# Patient Record
Sex: MALE | Race: WHITE
[De-identification: names, ages, dates, MRNs, and addresses within clinical notes are randomized per-mention and may not be internally consistent; named-entity substitution may affect disease eponyms.]

---

## 2019-09-19 ENCOUNTER — HOSPITAL ENCOUNTER (OUTPATIENT)
Age: 64
End: 2019-09-19
Payer: MEDICARE

## 2019-09-19 DIAGNOSIS — M25.572: Primary | ICD-10-CM

## 2019-09-19 PROCEDURE — 73610 X-RAY EXAM OF ANKLE: CPT

## 2020-03-11 ENCOUNTER — HOSPITAL ENCOUNTER
Age: 65
End: 2020-03-11
Payer: MEDICARE

## 2020-03-11 DIAGNOSIS — R53.83: Primary | ICD-10-CM

## 2020-03-11 DIAGNOSIS — E78.2: ICD-10-CM

## 2020-03-11 DIAGNOSIS — I10: ICD-10-CM

## 2020-03-11 DIAGNOSIS — N52.9: ICD-10-CM

## 2020-03-11 LAB
ADD MANUAL DIFF / SLIDE REVIEW: (no result)
BUN SERPL-MCNC: 12 MG/DL (ref 9–20)
CALCIUM SERPL-MCNC: 9.4 MG/DL (ref 8.4–10.2)
CHLORIDE SERPL-SCNC: 104 MMOL/L (ref 98–107)
CHOLEST SERPL-MCNC: 148 MG/DL (ref 140–199)
CO2 SERPL-SCNC: 26 MMOL/L (ref 22–32)
ESTIMATED GLOMERULAR FILT RATE: > 60 ML/MIN (ref 60–?)
GLUCOSE SERPL-MCNC: 213 MG/DL (ref 80–110)
HDLC SERPL-MCNC: 35 MG/DL (ref 40–60)
HEMATOCRIT: 45.9 % (ref 41–53)
HEMOGLOBIN: 15.9 G/DL (ref 13.5–17.5)
HEMOLYSIS: < 15 (ref 0–50)
LYMPHOCYTES # SPEC AUTO: 1800 /UL (ref 1100–4500)
MCV RBC: 90.8 FL (ref 80–100)
MEAN CORPUSCULAR HEMOGLOBIN: 31.5 PG (ref 26–34)
MEAN CORPUSCULAR HGB CONC: 34.7 % (ref 30–36)
PLATELET COUNT: 108 X10^3/UL (ref 150–400)
POTASSIUM SERPL-SCNC: 4 MMOL/L (ref 3.4–5.1)
SODIUM SERPL-SCNC: 140 MMOL/L (ref 137–145)
TRIGL SERPL-MCNC: 380 MG/DL (ref 35–150)
TSH W/ REFLEX TO FT4: 1.84 UIU/ML (ref 0.47–4.68)

## 2020-03-11 PROCEDURE — 80061 LIPID PANEL: CPT

## 2020-03-11 PROCEDURE — 84403 ASSAY OF TOTAL TESTOSTERONE: CPT

## 2020-03-11 PROCEDURE — 84450 TRANSFERASE (AST) (SGOT): CPT

## 2020-03-11 PROCEDURE — 84443 ASSAY THYROID STIM HORMONE: CPT

## 2020-03-11 PROCEDURE — 85025 COMPLETE CBC W/AUTO DIFF WBC: CPT

## 2020-03-11 PROCEDURE — 80048 BASIC METABOLIC PNL TOTAL CA: CPT

## 2020-06-09 ENCOUNTER — HOSPITAL ENCOUNTER (OUTPATIENT)
Age: 65
End: 2020-06-09
Payer: MEDICARE

## 2020-06-09 DIAGNOSIS — M77.32: ICD-10-CM

## 2020-06-09 DIAGNOSIS — M19.072: Primary | ICD-10-CM

## 2020-06-09 PROCEDURE — 73700 CT LOWER EXTREMITY W/O DYE: CPT

## 2020-06-18 ENCOUNTER — HOSPITAL ENCOUNTER (OUTPATIENT)
Age: 65
End: 2020-06-18
Payer: MEDICARE

## 2020-06-18 DIAGNOSIS — R50.9: Primary | ICD-10-CM

## 2020-06-18 PROCEDURE — 87635 SARS-COV-2 COVID-19 AMP PRB: CPT

## 2020-06-23 ENCOUNTER — HOSPITAL ENCOUNTER
Age: 65
End: 2020-06-23
Payer: MEDICARE

## 2020-06-23 DIAGNOSIS — R31.9: Primary | ICD-10-CM

## 2020-06-23 PROCEDURE — 87186 SC STD MICRODIL/AGAR DIL: CPT

## 2020-06-23 PROCEDURE — 87086 URINE CULTURE/COLONY COUNT: CPT

## 2020-06-23 PROCEDURE — 87077 CULTURE AEROBIC IDENTIFY: CPT

## 2021-05-26 ENCOUNTER — HOSPITAL ENCOUNTER (OUTPATIENT)
Age: 66
End: 2021-05-26
Payer: MEDICARE

## 2021-05-26 DIAGNOSIS — R22.41: Primary | ICD-10-CM

## 2021-05-26 DIAGNOSIS — R52: ICD-10-CM

## 2021-05-26 PROCEDURE — 93971 EXTREMITY STUDY: CPT

## 2021-06-09 ENCOUNTER — HOSPITAL ENCOUNTER (OUTPATIENT)
Dept: HOSPITAL 73 - ED | Age: 66
Setting detail: OBSERVATION
LOS: 1 days | Discharge: HOME | End: 2021-06-10
Payer: MEDICARE

## 2021-06-09 VITALS — HEART RATE: 102 BPM | SYSTOLIC BLOOD PRESSURE: 136 MMHG | DIASTOLIC BLOOD PRESSURE: 81 MMHG

## 2021-06-09 VITALS
RESPIRATION RATE: 25 BRPM | OXYGEN SATURATION: 99 % | HEART RATE: 80 BPM | SYSTOLIC BLOOD PRESSURE: 205 MMHG | DIASTOLIC BLOOD PRESSURE: 100 MMHG

## 2021-06-09 VITALS — OXYGEN SATURATION: 98 % | HEART RATE: 72 BPM | RESPIRATION RATE: 30 BRPM

## 2021-06-09 VITALS
RESPIRATION RATE: 15 BRPM | OXYGEN SATURATION: 96 % | DIASTOLIC BLOOD PRESSURE: 79 MMHG | HEART RATE: 67 BPM | SYSTOLIC BLOOD PRESSURE: 155 MMHG

## 2021-06-09 VITALS
DIASTOLIC BLOOD PRESSURE: 88 MMHG | OXYGEN SATURATION: 97 % | HEART RATE: 69 BPM | SYSTOLIC BLOOD PRESSURE: 187 MMHG | TEMPERATURE: 97.9 F | RESPIRATION RATE: 18 BRPM

## 2021-06-09 VITALS
DIASTOLIC BLOOD PRESSURE: 84 MMHG | SYSTOLIC BLOOD PRESSURE: 183 MMHG | RESPIRATION RATE: 14 BRPM | OXYGEN SATURATION: 97 % | HEART RATE: 67 BPM

## 2021-06-09 VITALS
DIASTOLIC BLOOD PRESSURE: 74 MMHG | TEMPERATURE: 97.52 F | OXYGEN SATURATION: 96 % | SYSTOLIC BLOOD PRESSURE: 159 MMHG | RESPIRATION RATE: 16 BRPM | HEART RATE: 71 BPM

## 2021-06-09 VITALS
DIASTOLIC BLOOD PRESSURE: 81 MMHG | SYSTOLIC BLOOD PRESSURE: 136 MMHG | TEMPERATURE: 97.52 F | HEART RATE: 80 BPM | RESPIRATION RATE: 16 BRPM | OXYGEN SATURATION: 97 %

## 2021-06-09 VITALS — OXYGEN SATURATION: 96 % | RESPIRATION RATE: 15 BRPM | HEART RATE: 67 BPM

## 2021-06-09 VITALS — OXYGEN SATURATION: 97 % | RESPIRATION RATE: 13 BRPM | HEART RATE: 67 BPM

## 2021-06-09 VITALS
DIASTOLIC BLOOD PRESSURE: 81 MMHG | HEART RATE: 73 BPM | RESPIRATION RATE: 27 BRPM | SYSTOLIC BLOOD PRESSURE: 143 MMHG | OXYGEN SATURATION: 96 %

## 2021-06-09 VITALS — RESPIRATION RATE: 23 BRPM | HEART RATE: 71 BPM | OXYGEN SATURATION: 98 %

## 2021-06-09 VITALS — HEART RATE: 71 BPM | RESPIRATION RATE: 19 BRPM | OXYGEN SATURATION: 98 %

## 2021-06-09 VITALS — BODY MASS INDEX: 1 KG/M2

## 2021-06-09 VITALS — RESPIRATION RATE: 15 BRPM | OXYGEN SATURATION: 96 % | HEART RATE: 66 BPM

## 2021-06-09 DIAGNOSIS — D72.829: ICD-10-CM

## 2021-06-09 DIAGNOSIS — M25.50: ICD-10-CM

## 2021-06-09 DIAGNOSIS — E11.9: ICD-10-CM

## 2021-06-09 DIAGNOSIS — D17.79: ICD-10-CM

## 2021-06-09 DIAGNOSIS — I10: ICD-10-CM

## 2021-06-09 DIAGNOSIS — Z20.822: ICD-10-CM

## 2021-06-09 DIAGNOSIS — E78.5: ICD-10-CM

## 2021-06-09 DIAGNOSIS — R42: ICD-10-CM

## 2021-06-09 DIAGNOSIS — R55: Primary | ICD-10-CM

## 2021-06-09 DIAGNOSIS — Z79.84: ICD-10-CM

## 2021-06-09 LAB
ADD MANUAL DIFF / SLIDE REVIEW: NO
ALBUMIN SERPL-MCNC: 3.9 G/DL (ref 3.5–5)
ALBUMIN/GLOB SERPL: 1.3 {RATIO} (ref 1–2.8)
ALP SERPL-CCNC: 75 U/L (ref 38–126)
ALT SERPL-CCNC: 20 IU/L (ref ?–50)
BUN SERPL-MCNC: 22 MG/DL (ref 9–20)
CALCIUM SERPL-MCNC: 9.4 MG/DL (ref 8.4–10.2)
CHLORIDE SERPL-SCNC: 102 MMOL/L (ref 98–107)
CK SERPL-CCNC: 43 U/L (ref 55–170)
CKMB % RELATIVE INDEX: (no result) % (ref 1.5–5)
CO2 SERPL-SCNC: 26 MMOL/L (ref 22–32)
ESTIMATED GLOMERULAR FILT RATE: > 60 ML/MIN (ref 60–?)
GLOBULIN SER CALC-MCNC: 2.9 G/DL (ref 1.7–4.1)
GLUCOSE SERPL-MCNC: 162 MG/DL (ref 80–110)
HEMATOCRIT: 45.7 % (ref 41–53)
HEMOGLOBIN: 15.2 G/DL (ref 13.5–17.5)
HEMOLYSIS: < 15 (ref 0–50)
LIPASE SERPL-CCNC: 191 U/L (ref 23–300)
LYMPHOCYTES # SPEC AUTO: 2500 /UL (ref 1100–4500)
MCV RBC: 89.3 FL (ref 80–100)
MEAN CORPUSCULAR HEMOGLOBIN: 29.8 PG (ref 26–34)
MEAN CORPUSCULAR HGB CONC: 33.3 % (ref 30–36)
NT-PROBNP SERPL-MCNC: 46 PG/ML (ref ?–125)
PLATELET COUNT: 179 X10^3/UL (ref 150–400)
POTASSIUM SERPL-SCNC: 4 MMOL/L (ref 3.4–5.1)
PROT SERPL-MCNC: 6.8 G/DL (ref 6.3–8.2)
SODIUM SERPL-SCNC: 137 MMOL/L (ref 137–145)
TROPONIN I SERPL-MCNC: < 0.012 NG/ML (ref 0.01–0.03)

## 2021-06-09 PROCEDURE — 85025 COMPLETE CBC W/AUTO DIFF WBC: CPT

## 2021-06-09 PROCEDURE — 96360 HYDRATION IV INFUSION INIT: CPT

## 2021-06-09 PROCEDURE — C8929 TTE W OR WO FOL WCON,DOPPLER: HCPCS

## 2021-06-09 PROCEDURE — 82550 ASSAY OF CK (CPK): CPT

## 2021-06-09 PROCEDURE — 71275 CT ANGIOGRAPHY CHEST: CPT

## 2021-06-09 PROCEDURE — 82962 GLUCOSE BLOOD TEST: CPT

## 2021-06-09 PROCEDURE — 97116 GAIT TRAINING THERAPY: CPT

## 2021-06-09 PROCEDURE — 96361 HYDRATE IV INFUSION ADD-ON: CPT

## 2021-06-09 PROCEDURE — 36415 COLL VENOUS BLD VENIPUNCTURE: CPT

## 2021-06-09 PROCEDURE — 97162 PT EVAL MOD COMPLEX 30 MIN: CPT

## 2021-06-09 PROCEDURE — 99284 EMERGENCY DEPT VISIT MOD MDM: CPT

## 2021-06-09 PROCEDURE — 83880 ASSAY OF NATRIURETIC PEPTIDE: CPT

## 2021-06-09 PROCEDURE — 80048 BASIC METABOLIC PNL TOTAL CA: CPT

## 2021-06-09 PROCEDURE — 93005 ELECTROCARDIOGRAM TRACING: CPT

## 2021-06-09 PROCEDURE — 84484 ASSAY OF TROPONIN QUANT: CPT

## 2021-06-09 PROCEDURE — 74174 CTA ABD&PLVS W/CONTRAST: CPT

## 2021-06-09 PROCEDURE — G0378 HOSPITAL OBSERVATION PER HR: HCPCS

## 2021-06-09 PROCEDURE — 80053 COMPREHEN METABOLIC PANEL: CPT

## 2021-06-09 PROCEDURE — 87635 SARS-COV-2 COVID-19 AMP PRB: CPT

## 2021-06-09 PROCEDURE — 71045 X-RAY EXAM CHEST 1 VIEW: CPT

## 2021-06-09 PROCEDURE — 85379 FIBRIN DEGRADATION QUANT: CPT

## 2021-06-09 PROCEDURE — 83690 ASSAY OF LIPASE: CPT

## 2021-06-09 PROCEDURE — 96372 THER/PROPH/DIAG INJ SC/IM: CPT

## 2021-06-09 RX ADMIN — ASPIRIN 324 MG: 81 TABLET, CHEWABLE ORAL at 12:11

## 2021-06-09 RX ADMIN — SODIUM CHLORIDE 150 ML: 0.9 INJECTION, SOLUTION INTRAVENOUS at 12:11

## 2021-06-09 RX ADMIN — INSULIN LISPRO 0 UNIT: 100 INJECTION, SOLUTION INTRAVENOUS; SUBCUTANEOUS at 22:00

## 2021-06-09 RX ADMIN — METFORMIN HYDROCHLORIDE 1000 MG: 500 TABLET ORAL at 21:59

## 2021-06-09 RX ADMIN — SODIUM CHLORIDE 150 ML: 0.9 INJECTION, SOLUTION INTRAVENOUS at 22:05

## 2021-06-10 VITALS
SYSTOLIC BLOOD PRESSURE: 143 MMHG | TEMPERATURE: 98.1 F | HEART RATE: 86 BPM | OXYGEN SATURATION: 96 % | DIASTOLIC BLOOD PRESSURE: 86 MMHG | RESPIRATION RATE: 18 BRPM

## 2021-06-10 VITALS — DIASTOLIC BLOOD PRESSURE: 82 MMHG | OXYGEN SATURATION: 96 % | HEART RATE: 86 BPM | SYSTOLIC BLOOD PRESSURE: 148 MMHG

## 2021-06-10 VITALS
HEART RATE: 89 BPM | DIASTOLIC BLOOD PRESSURE: 73 MMHG | SYSTOLIC BLOOD PRESSURE: 137 MMHG | OXYGEN SATURATION: 97 % | RESPIRATION RATE: 16 BRPM | TEMPERATURE: 98.7 F

## 2021-06-10 VITALS
OXYGEN SATURATION: 97 % | DIASTOLIC BLOOD PRESSURE: 90 MMHG | HEART RATE: 85 BPM | TEMPERATURE: 99 F | RESPIRATION RATE: 20 BRPM | SYSTOLIC BLOOD PRESSURE: 170 MMHG

## 2021-06-10 LAB
ADD MANUAL DIFF / SLIDE REVIEW: NO
BUN SERPL-MCNC: 16 MG/DL (ref 9–20)
CALCIUM SERPL-MCNC: 8.6 MG/DL (ref 8.4–10.2)
CHLORIDE SERPL-SCNC: 105 MMOL/L (ref 98–107)
CO2 SERPL-SCNC: 25 MMOL/L (ref 22–32)
ESTIMATED GLOMERULAR FILT RATE: > 60 ML/MIN (ref 60–?)
GLUCOSE SERPL-MCNC: 176 MG/DL (ref 80–110)
HEMATOCRIT: 40.9 % (ref 41–53)
HEMOGLOBIN: 13.7 G/DL (ref 13.5–17.5)
HEMOLYSIS: < 15 (ref 0–50)
LYMPHOCYTES # SPEC AUTO: 1800 /UL (ref 1100–4500)
MCV RBC: 88.8 FL (ref 80–100)
MEAN CORPUSCULAR HEMOGLOBIN: 29.8 PG (ref 26–34)
MEAN CORPUSCULAR HGB CONC: 33.5 % (ref 30–36)
PLATELET COUNT: 135 X10^3/UL (ref 150–400)
POTASSIUM SERPL-SCNC: 4.2 MMOL/L (ref 3.4–5.1)
SODIUM SERPL-SCNC: 136 MMOL/L (ref 137–145)
TROPONIN I SERPL-MCNC: < 0.012 NG/ML (ref 0.01–0.03)

## 2021-06-10 RX ADMIN — METFORMIN HYDROCHLORIDE 1000 MG: 500 TABLET ORAL at 10:30

## 2021-06-10 RX ADMIN — OXYCODONE AND ACETAMINOPHEN 1 TAB: 5; 325 TABLET ORAL at 14:15

## 2021-06-10 RX ADMIN — AMLODIPINE BESYLATE 5 MG: 5 TABLET ORAL at 10:30

## 2021-06-10 RX ADMIN — OXYCODONE AND ACETAMINOPHEN 1 TAB: 5; 325 TABLET ORAL at 01:34

## 2021-06-10 RX ADMIN — ATORVASTATIN CALCIUM 40 MG: 20 TABLET, FILM COATED ORAL at 10:31

## 2021-06-10 RX ADMIN — Medication 10 ML: at 10:57

## 2021-06-10 RX ADMIN — Medication 10 ML: at 04:49

## 2021-06-10 RX ADMIN — OXYCODONE AND ACETAMINOPHEN 1 TAB: 5; 325 TABLET ORAL at 10:56

## 2021-06-10 RX ADMIN — INSULIN LISPRO 0 UNIT: 100 INJECTION, SOLUTION INTRAVENOUS; SUBCUTANEOUS at 12:53

## 2021-06-10 RX ADMIN — ASPIRIN 81 MG: 81 TABLET, COATED ORAL at 10:32

## 2021-06-22 ENCOUNTER — HOSPITAL ENCOUNTER (OUTPATIENT)
Age: 66
End: 2021-06-22
Payer: MEDICARE

## 2021-06-22 VITALS — BODY MASS INDEX: 35.8 KG/M2

## 2021-06-22 DIAGNOSIS — Z20.822: Primary | ICD-10-CM

## 2021-06-22 PROCEDURE — 87635 SARS-COV-2 COVID-19 AMP PRB: CPT

## 2021-06-23 ENCOUNTER — HOSPITAL ENCOUNTER (OUTPATIENT)
Age: 66
Discharge: HOME | End: 2021-06-23
Payer: MEDICARE

## 2021-06-23 VITALS
TEMPERATURE: 98 F | OXYGEN SATURATION: 97 % | RESPIRATION RATE: 14 BRPM | SYSTOLIC BLOOD PRESSURE: 176 MMHG | DIASTOLIC BLOOD PRESSURE: 98 MMHG | HEART RATE: 83 BPM

## 2021-06-23 VITALS
DIASTOLIC BLOOD PRESSURE: 101 MMHG | OXYGEN SATURATION: 97 % | SYSTOLIC BLOOD PRESSURE: 167 MMHG | HEART RATE: 78 BPM | TEMPERATURE: 97.7 F | RESPIRATION RATE: 16 BRPM

## 2021-06-23 VITALS — BODY MASS INDEX: 35.8 KG/M2 | BODY MASS INDEX: 35 KG/M2

## 2021-06-23 DIAGNOSIS — K21.9: ICD-10-CM

## 2021-06-23 DIAGNOSIS — I10: ICD-10-CM

## 2021-06-23 DIAGNOSIS — H53.8: Primary | ICD-10-CM

## 2021-06-23 DIAGNOSIS — E11.9: ICD-10-CM

## 2021-06-23 DIAGNOSIS — M25.50: ICD-10-CM

## 2021-06-23 DIAGNOSIS — Z53.09: ICD-10-CM

## 2021-06-23 PROCEDURE — 37609 LIGATION/BX TEMPORAL ARTERY: CPT

## 2021-06-23 PROCEDURE — 82962 GLUCOSE BLOOD TEST: CPT

## 2022-05-23 ENCOUNTER — HOSPITAL ENCOUNTER (OUTPATIENT)
Age: 67
End: 2022-05-23
Payer: MEDICARE

## 2022-05-23 VITALS — BODY MASS INDEX: 35.8 KG/M2

## 2022-05-23 DIAGNOSIS — M35.3: ICD-10-CM

## 2022-05-23 DIAGNOSIS — E11.69: Primary | ICD-10-CM

## 2022-05-23 DIAGNOSIS — E78.5: ICD-10-CM

## 2022-05-23 DIAGNOSIS — I10: ICD-10-CM

## 2022-05-23 DIAGNOSIS — E78.2: ICD-10-CM

## 2022-05-23 DIAGNOSIS — N40.0: ICD-10-CM

## 2022-05-23 LAB
ALBUMIN SERPL-MCNC: 4.5 G/DL (ref 3.5–5)
ALBUMIN/GLOB SERPL: 1.7 {RATIO} (ref 1–2.8)
ALP SERPL-CCNC: 72 U/L (ref 38–126)
ALT SERPL-CCNC: 22 IU/L (ref ?–50)
BUN SERPL-MCNC: 24 MG/DL (ref 9–20)
CALCIUM SERPL-MCNC: 9.3 MG/DL (ref 8.4–10.2)
CHLORIDE SERPL-SCNC: 102 MMOL/L (ref 98–107)
CHOLEST SERPL-MCNC: 162 MG/DL (ref 140–199)
CO2 SERPL-SCNC: 28 MMOL/L (ref 22–32)
ESTIMATED GLOMERULAR FILT RATE: > 60 ML/MIN (ref 60–?)
GLOBULIN SER CALC-MCNC: 2.6 G/DL (ref 1.7–4.1)
GLUCOSE SERPL-MCNC: 177 MG/DL (ref 80–110)
HBA1C MFR BLD: 7.5 % (ref 4–6)
HDLC SERPL-MCNC: 54 MG/DL (ref 40–60)
HEMATOCRIT: 44.5 % (ref 41–53)
HEMOGLOBIN: 15.4 G/DL (ref 13.5–17.5)
HEMOLYSIS: < 15 (ref 0–50)
MCV RBC: 87.5 FL (ref 80–100)
MEAN CORPUSCULAR HEMOGLOBIN: 30.3 PG (ref 26–34)
MEAN CORPUSCULAR HGB CONC: 34.6 % (ref 30–36)
MICROALBUMI CREATININ RATIO UR: 470.1 UG/MG CR (ref ?–30)
PLATELET COUNT: 131 X10^3/UL (ref 150–400)
POTASSIUM SERPL-SCNC: 3.9 MMOL/L (ref 3.4–5.1)
PROT SERPL-MCNC: 7.1 G/DL (ref 6.3–8.2)
PSA SERPL-MCNC: 1.81 NG/ML (ref 0.1–4)
SODIUM SERPL-SCNC: 138 MMOL/L (ref 137–145)
TRIGL SERPL-MCNC: 242 MG/DL (ref 35–150)
TSH W/ REFLEX TO FT4: 2.11 UIU/ML (ref 0.47–4.68)

## 2022-05-23 PROCEDURE — 82570 ASSAY OF URINE CREATININE: CPT

## 2022-05-23 PROCEDURE — 84443 ASSAY THYROID STIM HORMONE: CPT

## 2022-05-23 PROCEDURE — 83036 HEMOGLOBIN GLYCOSYLATED A1C: CPT

## 2022-05-23 PROCEDURE — 82043 UR ALBUMIN QUANTITATIVE: CPT

## 2022-05-23 PROCEDURE — 86140 C-REACTIVE PROTEIN: CPT

## 2022-05-23 PROCEDURE — 80061 LIPID PANEL: CPT

## 2022-05-23 PROCEDURE — 80053 COMPREHEN METABOLIC PANEL: CPT

## 2022-05-23 PROCEDURE — 85027 COMPLETE CBC AUTOMATED: CPT

## 2022-05-23 PROCEDURE — 85651 RBC SED RATE NONAUTOMATED: CPT

## 2022-05-23 PROCEDURE — 84153 ASSAY OF PSA TOTAL: CPT

## 2022-05-23 PROCEDURE — 36415 COLL VENOUS BLD VENIPUNCTURE: CPT

## 2022-09-16 ENCOUNTER — HOSPITAL ENCOUNTER (OUTPATIENT)
Age: 67
End: 2022-09-16
Payer: MEDICARE

## 2022-09-16 VITALS — BODY MASS INDEX: 35.8 KG/M2

## 2022-09-16 DIAGNOSIS — E11.42: Primary | ICD-10-CM

## 2022-09-16 LAB — HBA1C MFR BLD: 7.3 % (ref 4–6)

## 2022-09-16 PROCEDURE — 83036 HEMOGLOBIN GLYCOSYLATED A1C: CPT

## 2022-09-16 PROCEDURE — 36415 COLL VENOUS BLD VENIPUNCTURE: CPT

## 2023-02-14 ENCOUNTER — HOSPITAL ENCOUNTER (OUTPATIENT)
Age: 68
End: 2023-02-14
Payer: MEDICARE

## 2023-02-14 VITALS — BODY MASS INDEX: 35.8 KG/M2

## 2023-02-14 DIAGNOSIS — I87.2: ICD-10-CM

## 2023-02-14 DIAGNOSIS — E11.42: Primary | ICD-10-CM

## 2023-02-14 LAB
BUN SERPL-MCNC: 23 MG/DL (ref 9–20)
CALCIUM SERPL-MCNC: 9 MG/DL (ref 8.4–10.2)
CHLORIDE SERPL-SCNC: 103 MMOL/L (ref 98–107)
CO2 SERPL-SCNC: 26 MMOL/L (ref 22–32)
ESTIMATED GLOMERULAR FILT RATE: > 60 ML/MIN (ref 60–?)
GLUCOSE SERPL-MCNC: 100 MG/DL (ref 80–110)
HBA1C MFR BLD: 7 % (ref 4–6)
HEMOLYSIS: < 15 (ref 0–50)
POTASSIUM SERPL-SCNC: 3.8 MMOL/L (ref 3.4–5.1)
SODIUM SERPL-SCNC: 140 MMOL/L (ref 137–145)

## 2023-02-14 PROCEDURE — 80048 BASIC METABOLIC PNL TOTAL CA: CPT

## 2023-02-14 PROCEDURE — 83036 HEMOGLOBIN GLYCOSYLATED A1C: CPT

## 2023-02-14 PROCEDURE — 36415 COLL VENOUS BLD VENIPUNCTURE: CPT

## 2023-02-23 ENCOUNTER — HOSPITAL ENCOUNTER (OUTPATIENT)
Age: 68
Discharge: HOME | End: 2023-02-23
Payer: MEDICARE

## 2023-02-23 VITALS
OXYGEN SATURATION: 97 % | TEMPERATURE: 98.1 F | RESPIRATION RATE: 18 BRPM | DIASTOLIC BLOOD PRESSURE: 85 MMHG | SYSTOLIC BLOOD PRESSURE: 133 MMHG | HEART RATE: 77 BPM

## 2023-02-23 VITALS
SYSTOLIC BLOOD PRESSURE: 118 MMHG | RESPIRATION RATE: 11 BRPM | HEART RATE: 78 BPM | OXYGEN SATURATION: 96 % | DIASTOLIC BLOOD PRESSURE: 81 MMHG

## 2023-02-23 VITALS
SYSTOLIC BLOOD PRESSURE: 158 MMHG | OXYGEN SATURATION: 98 % | HEART RATE: 72 BPM | RESPIRATION RATE: 16 BRPM | DIASTOLIC BLOOD PRESSURE: 92 MMHG

## 2023-02-23 VITALS
HEART RATE: 100 BPM | SYSTOLIC BLOOD PRESSURE: 113 MMHG | DIASTOLIC BLOOD PRESSURE: 69 MMHG | OXYGEN SATURATION: 98 % | RESPIRATION RATE: 16 BRPM

## 2023-02-23 VITALS — BODY MASS INDEX: 35.8 KG/M2 | BODY MASS INDEX: 36 KG/M2

## 2023-02-23 DIAGNOSIS — Z12.11: Primary | ICD-10-CM

## 2023-02-23 DIAGNOSIS — K63.5: ICD-10-CM

## 2023-02-23 PROCEDURE — 0DJD8ZZ INSPECTION OF LOWER INTESTINAL TRACT, VIA NATURAL OR ARTIFICIAL OPENING ENDOSCOPIC: ICD-10-PCS | Performed by: SURGERY

## 2023-02-23 PROCEDURE — 45385 COLONOSCOPY W/LESION REMOVAL: CPT

## 2023-06-06 ENCOUNTER — HOSPITAL ENCOUNTER (OUTPATIENT)
Age: 68
End: 2023-06-06
Payer: MEDICARE

## 2023-06-06 VITALS — BODY MASS INDEX: 35.8 KG/M2

## 2023-06-06 DIAGNOSIS — E78.5: ICD-10-CM

## 2023-06-06 DIAGNOSIS — N28.9: ICD-10-CM

## 2023-06-06 DIAGNOSIS — I10: ICD-10-CM

## 2023-06-06 DIAGNOSIS — E11.42: Primary | ICD-10-CM

## 2023-06-06 DIAGNOSIS — E11.69: ICD-10-CM

## 2023-06-06 LAB
CHOLEST SERPL-MCNC: 124 MG/DL (ref 140–199)
HDLC SERPL-MCNC: 45 MG/DL (ref 40–60)
MICROALBUMI CREATININ RATIO UR: 237.3 UG/MG CR (ref ?–30)
TRIGL SERPL-MCNC: 148 MG/DL (ref 35–150)

## 2023-06-06 PROCEDURE — 36415 COLL VENOUS BLD VENIPUNCTURE: CPT

## 2023-06-06 PROCEDURE — 82043 UR ALBUMIN QUANTITATIVE: CPT

## 2023-06-06 PROCEDURE — 80061 LIPID PANEL: CPT

## 2023-06-06 PROCEDURE — 82570 ASSAY OF URINE CREATININE: CPT

## 2023-07-24 ENCOUNTER — HOSPITAL ENCOUNTER (OUTPATIENT)
Age: 68
End: 2023-07-24
Payer: MEDICARE

## 2023-07-24 VITALS — BODY MASS INDEX: 35.8 KG/M2

## 2023-07-24 DIAGNOSIS — M19.172: Primary | ICD-10-CM

## 2023-07-24 DIAGNOSIS — M25.472: ICD-10-CM

## 2023-07-24 PROCEDURE — 73700 CT LOWER EXTREMITY W/O DYE: CPT

## 2023-08-23 ENCOUNTER — HOSPITAL ENCOUNTER (OUTPATIENT)
Age: 68
End: 2023-08-23
Payer: MEDICARE

## 2023-08-23 VITALS — BODY MASS INDEX: 35.8 KG/M2

## 2023-08-23 DIAGNOSIS — R73.9: ICD-10-CM

## 2023-08-23 DIAGNOSIS — Z01.818: Primary | ICD-10-CM

## 2023-08-23 DIAGNOSIS — Z01.812: ICD-10-CM

## 2023-08-23 LAB
ADD MANUAL DIFF / SLIDE REVIEW: NO
ALBUMIN SERPL-MCNC: 4.3 G/DL (ref 3.5–5)
ALBUMIN/GLOB SERPL: 1.7 {RATIO} (ref 1–2.8)
ALP SERPL-CCNC: 67 U/L (ref 38–126)
ALT SERPL-CCNC: 22 IU/L (ref ?–50)
BUN SERPL-MCNC: 28 MG/DL (ref 9–20)
CALCIUM SERPL-MCNC: 8.8 MG/DL (ref 8.4–10.2)
CHLORIDE SERPL-SCNC: 102 MMOL/L (ref 98–107)
CO2 SERPL-SCNC: 24 MMOL/L (ref 22–32)
ESTIMATED GLOMERULAR FILT RATE: 47 ML/MIN (ref 60–?)
GLOBULIN SER CALC-MCNC: 2.6 G/DL (ref 1.7–4.1)
GLUCOSE SERPL-MCNC: 191 MG/DL (ref 80–110)
HBA1C MFR BLD: 6.4 % (ref 4–6)
HEMATOCRIT: 41.9 % (ref 41–53)
HEMOGLOBIN: 14.5 G/DL (ref 13.5–17.5)
HEMOLYSIS: < 15 (ref 0–50)
LYMPHOCYTES # SPEC AUTO: 1700 /UL (ref 1100–4500)
MCV RBC: 87 FL (ref 80–100)
MEAN CORPUSCULAR HEMOGLOBIN: 30.1 PG (ref 26–34)
MEAN CORPUSCULAR HGB CONC: 34.5 % (ref 30–36)
PLATELET COUNT: 143 X10^3/UL (ref 150–400)
POTASSIUM SERPL-SCNC: 3.7 MMOL/L (ref 3.4–5.1)
PROT SERPL-MCNC: 6.9 G/DL (ref 6.3–8.2)
SODIUM SERPL-SCNC: 139 MMOL/L (ref 137–145)

## 2023-08-23 PROCEDURE — 36415 COLL VENOUS BLD VENIPUNCTURE: CPT

## 2023-08-23 PROCEDURE — 85025 COMPLETE CBC W/AUTO DIFF WBC: CPT

## 2023-08-23 PROCEDURE — 83036 HEMOGLOBIN GLYCOSYLATED A1C: CPT

## 2023-08-23 PROCEDURE — 93005 ELECTROCARDIOGRAM TRACING: CPT

## 2023-08-23 PROCEDURE — 80053 COMPREHEN METABOLIC PANEL: CPT

## 2023-09-13 ENCOUNTER — HOSPITAL ENCOUNTER (OUTPATIENT)
Age: 68
End: 2023-09-13
Payer: MEDICARE

## 2023-09-13 VITALS — BODY MASS INDEX: 35.8 KG/M2

## 2023-09-13 DIAGNOSIS — R79.89: Primary | ICD-10-CM

## 2023-09-13 LAB
BUN SERPL-MCNC: 24 MG/DL (ref 9–20)
CALCIUM SERPL-MCNC: 9 MG/DL (ref 8.4–10.2)
CHLORIDE SERPL-SCNC: 102 MMOL/L (ref 98–107)
CO2 SERPL-SCNC: 26 MMOL/L (ref 22–32)
ESTIMATED GLOMERULAR FILT RATE: 49 ML/MIN (ref 60–?)
GLUCOSE SERPL-MCNC: 103 MG/DL (ref 80–110)
HEMOLYSIS: < 15 (ref 0–50)
POTASSIUM SERPL-SCNC: 3.7 MMOL/L (ref 3.4–5.1)
SODIUM SERPL-SCNC: 139 MMOL/L (ref 137–145)

## 2023-09-13 PROCEDURE — 36415 COLL VENOUS BLD VENIPUNCTURE: CPT

## 2023-09-13 PROCEDURE — 80048 BASIC METABOLIC PNL TOTAL CA: CPT

## 2023-09-20 ENCOUNTER — HOSPITAL ENCOUNTER (OUTPATIENT)
Age: 68
End: 2023-09-20
Payer: MEDICARE

## 2023-09-20 VITALS — BODY MASS INDEX: 35.8 KG/M2

## 2023-09-20 DIAGNOSIS — R79.89: Primary | ICD-10-CM

## 2023-09-20 LAB
BUN SERPL-MCNC: 22 MG/DL (ref 9–20)
CALCIUM SERPL-MCNC: 9.4 MG/DL (ref 8.4–10.2)
CHLORIDE SERPL-SCNC: 104 MMOL/L (ref 98–107)
CO2 SERPL-SCNC: 26 MMOL/L (ref 22–32)
ESTIMATED GLOMERULAR FILT RATE: > 60 ML/MIN (ref 60–?)
GLUCOSE SERPL-MCNC: 116 MG/DL (ref 80–110)
HEMOLYSIS: < 15 (ref 0–50)
POTASSIUM SERPL-SCNC: 4.3 MMOL/L (ref 3.4–5.1)
SODIUM SERPL-SCNC: 141 MMOL/L (ref 137–145)

## 2023-09-20 PROCEDURE — 36415 COLL VENOUS BLD VENIPUNCTURE: CPT

## 2023-09-20 PROCEDURE — 80048 BASIC METABOLIC PNL TOTAL CA: CPT

## 2023-10-26 ENCOUNTER — HOSPITAL ENCOUNTER (OUTPATIENT)
Dept: HOSPITAL 76 - SC | Age: 68
Discharge: HOME | End: 2023-10-26
Attending: NURSE PRACTITIONER
Payer: MEDICARE

## 2023-10-26 VITALS — DIASTOLIC BLOOD PRESSURE: 84 MMHG | SYSTOLIC BLOOD PRESSURE: 132 MMHG | OXYGEN SATURATION: 97 %

## 2023-10-26 DIAGNOSIS — G47.33: Primary | ICD-10-CM

## 2023-10-26 DIAGNOSIS — Z87.891: ICD-10-CM

## 2023-10-26 DIAGNOSIS — E66.9: ICD-10-CM

## 2023-10-26 PROCEDURE — 99203 OFFICE O/P NEW LOW 30 MIN: CPT

## 2023-10-26 PROCEDURE — 99212 OFFICE O/P EST SF 10 MIN: CPT

## 2023-10-26 NOTE — SLEEP CARE CONSULTATION
Information from patient questionnaire entered by Xochilt Tobias.





I have reviewed and concur with the information entered by Xochilt Tobias. This 

document represents the service I personally performed and the decisions made by

me, Su Ramirez ARNP.





History of Present Illness


Service Date and Time: 10/26/2023    1307


Reason for Visit: New patient, Previously diagnosed sleep apnea


Chief Complaint: reports: Unrefreshed sleep, Excessive daytime sleepiness, Other

(UPDATE SUPPLIES)


Usual bedtime: 11-1130PM


Time it takes to fall asleep: 10MINS


Snores at night: Yes


Sleeps alone due to snoring: Yes


Number of times waking at night: 1-2


Reasons for waking at night: reports: Bathroom


Toss, Turn, or Twitch while sleeping: Yes


Recalls having dreams: Yes


Usually gets out of bed at: 730-8AM


Feels refreshed in the morning: No


Morning headache: No


Sleepy or fatigued during the day: Yes


Ever fallen asleep while driving: No


Takes day naps: Yes


Dreams during day naps: No


Prior sleep studies: Yes ([facility])


Additional HPI information: 





WILBERT GRIMES was previously diagnosed to have extremely severe, .2, 

obstructive sleep apnea-hypopnea syndrome diagnosed in sleep study dated 

2006 at [facility] and comes in today to establish care for BIPAP 

therapy.





- Parasomnia Symptoms


Ever been unable to move upon waking from sleep: No


Walks in sleep: No


Talks in sleep: No


Ever acted out dreams in sleep: No


Ever felt weak in the knees when startled or emotional: No


Bothered by creepy, crawly, restless sensations in legs: Yes


Problems with memory or concentration: No





CPAP Compliance Data


Compliance data discussion: 





He has a Damian BIPAP. He gets his supplies through [employer]. He is using a nasal

pillows mask, Dreamwear, with medium cushion. He also uses a chin strap to 

reduce oral dryness. 





Subjective


Patient concerns: denies: aerophagia, mask discomfort, air blowing in eyes, mask

leak noise, condensation in mask/hose, nasal congestion, dry mouth, nose, 

throat, epistaxis


Observed to snore while using device: No


Current pressure setting perceived as: comfortable


On therapy, patient: reports: sleeping better, awakening more refreshed, being 

more awake and alert during the day, more rested overall.  denies: drowsiness 

while driving


Initial [place] Sleepiness Scale score: 13 (10/03/23)





Past Medical History


Past Medical History: reports: Hypertension, Diabetes, Arthritis, Insulin 

resistance, Impotence, GERD, Other (polymialga rheumatica (EMR))





Social History


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 Patient is  and lives in [place]. 





Have you smoked in the past 12 months: No


Cigarettes per day (20/pack): 30


Years of smokin


Quit date: 


Smoking Pack Years: 46.5


Alcohol use: Yes


Alcohol amount and frequency: 1-2OZ A MONTH


Caffeine use: Yes


Caffeine amount and frequency: 2 CUPS IN MORNINGS





Family History


Family history of sleep disordered breathing: Yes


Family Hx Sleep Apnea: Father: Snoring, Sleep apnea - Untreated





Allergies and Home Medications


Known drug allergies: No


Drug allergies reviewed: Yes


Home medication list reviewed: Yes


Allergy and home medication list: 





Home Medications











 Medication  Instructions  Recorded  Confirmed  Last Taken  Type


 


Amlodipine Besylate [Norvasc] See Rx Instructions .ROUTE .COMPLEX 10/26/23 

10/26/23 Unknown History


 


Aspirin [Aspirin EC] See Rx Instructions .ROUTE .COMPLEX 10/26/23 10/26/23 

Unknown History


 


Carisoprodol [Soma] See Rx Instructions .ROUTE .COMPLEX 10/26/23 10/26/23 

Unknown History


 


Insulin Glargine [Lantus Solostar] See Rx Instructions .ROUTE .COMPLEX 10/26/23 

10/26/23 Unknown History


 


Metformin HCl [Metformin ER See Rx Instructions .ROUTE .COMPLEX 10/26/23 

10/26/23 Unknown History





Gastric]     


 


Omeprazole Magnesium See Rx Instructions .ROUTE .COMPLEX 10/26/23 10/26/23 

Unknown History


 


Prednisone [Quyen] See Rx Instructions .ROUTE .COMPLEX 10/26/23 10/26/23 Unknown

 History


 


Rosuvastatin Calcium See Rx Instructions .ROUTE .COMPLEX 10/26/23 10/26/23 Unkn

own History


 


Semaglutide [Ozempic] See Rx Instructions .ROUTE .COMPLEX 10/26/23 10/26/23 

Unknown History


 


Triamterene/Hydrochlorothiazid See Rx Instructions .ROUTE .COMPLEX 10/26/23 

10/26/23 Unknown History





[Triamterene-Hctz 37.5-25 mg Cp]     


 


carvediloL [Coreg] See Rx Instructions .ROUTE .COMPLEX 10/26/23 10/26/23 Unknown

 History


 


metFORMIN [Glucophage] See Rx Instructions .ROUTE .COMPLEX 10/26/23 10/26/23 

Unknown History











Review of Systems


Weight loss over past 5 years: 24


Cardiovascular: reports: high blood pressure, leg or foot swelling


Gastrointestinal: reports: heartburn


Urinary: reports: frequency, impotence


Neurological: reports: gait or balance problems


Ear/Nose/Throat: reports: sinus problems, dry mouth/throat, tonsillectomy


Endocrine: reports: sluggishness, unexplained weakness


Musculoskeletal: reports: joint pain, back pain


Immunologic: reports: sneezing





Physical Exam


Vital signs obtained and entered by: XOCHILT LIRIANO MA


Blood Pressure: 132/84 (LEFT ARM)


Cuff size: long


Heart Rate: 86


O2 Saturation: 97


Height: 5 ft 4 in


Weight: 223 lb


Body Mass Index: 38.2


BMI Classification: Obese


Neck circumference: 19.5


Mouth and throat: narrow oropharynx


Soft palate: long


Hard palate: normal


Uvula: normal


Uvula visualization: 0% Mallampati Class IV


Tongue: enlarged in size with teeth marks on lateral edges


Tonsils: absent bilaterally


Heart: regular rate and rhythm


Lungs: clear bilaterally





Impression and Plan





1. Obstructive Sleep Apnea-Hypopnea Syndrome, extremely severe, with unknown 

treatment compliance and unknown apnea control. On BIPAP therapy, the patient 

has better sleep quality and is more rested overall. He states he uses it every 

night. We were unable to get his data from the SD card. He has not had his data 

checked in a long time. He has been buying his supplies as needed online. He is 

eligible for a new device since his Damian was issued to him in . I will 

set him up with a new DME to obtain a new device and supplies as needed. He 

agreed. I will have my discharge coordinator inform of DME options. A [facility] 

prescription will then be made. Patient advised to contact this office if 

further supply problems. Patient's apnea severity and rationale for treatment to

 reduce apnea, improve sleep quality and reduce cardiovascular and 

cerebrovascular events was reviewed. I also reviewed the benefit of consistent 

device use of BIPAP for hypertension, diabetes and gastric reflux.





2. Obesity, unspecified. Currently patients BMI is 38.2.  Obesity increases the

 risk of apnea, BIPAP pressure requirements and overall health risks especially 

cardiovascular and diabetes. Thus patient is advised to lose weight. 





* Continue BIPAP pressure at 24/14 cmH2O with 4 cmH2O pressure support


* Transfer DME


* Update machine


* Update supply prescription


* Notify me if snoring with mask or feeling that the pressure is too much or too

   little


* Attempt to lose weight


* Call this office if any problems using BIPAP


* Return for follow up one month after obtaining new device, or sooner if con

  cerns arise








Counseling Topics: Weight loss health impact


Prescriptions: Auto CPAP, Device supplies


Visit Type: In Office


Time Spent with Patient (minutes): 38


Provider Statement: I spent 100% of the Face to Face Visit with the patient with

 greater than 50% spent counseling the patient and coordination of care.

## 2023-10-26 NOTE — SLEEP PATIENT INSTRUCTIONS
Sleep Center Visit Summary





- Patient Visit Information


Reason for Visit: 





Initial consultation





- Patient Instructions


Additional Instructions: 








You will continue with BIPAP therapy with pressure set at 24/14 cmH2O.





A prescription to update your BIPAP and supplies has been completed. Please call

to set up followup appointment once you have your new BIPAP. 





We encourage you to continue to try to lose weight. 





Please follow up with the sleep care office one month after obtaining new 

device.








- Clinic Information


Contact: 





[facility]


[address] [place] [address]


[email or web address]


T: [phone]

## 2023-11-03 ENCOUNTER — HOSPITAL ENCOUNTER (OUTPATIENT)
Dept: HOSPITAL 73 - OR | Age: 68
Discharge: HOME | End: 2023-11-03
Payer: MEDICARE

## 2023-11-03 VITALS
TEMPERATURE: 97.88 F | OXYGEN SATURATION: 90 % | HEART RATE: 90 BPM | RESPIRATION RATE: 12 BRPM | SYSTOLIC BLOOD PRESSURE: 98 MMHG | DIASTOLIC BLOOD PRESSURE: 64 MMHG

## 2023-11-03 VITALS
TEMPERATURE: 98 F | HEART RATE: 93 BPM | RESPIRATION RATE: 18 BRPM | OXYGEN SATURATION: 95 % | SYSTOLIC BLOOD PRESSURE: 140 MMHG | DIASTOLIC BLOOD PRESSURE: 75 MMHG

## 2023-11-03 VITALS
RESPIRATION RATE: 16 BRPM | SYSTOLIC BLOOD PRESSURE: 112 MMHG | HEART RATE: 96 BPM | OXYGEN SATURATION: 94 % | DIASTOLIC BLOOD PRESSURE: 71 MMHG

## 2023-11-03 VITALS
HEART RATE: 95 BPM | DIASTOLIC BLOOD PRESSURE: 75 MMHG | RESPIRATION RATE: 17 BRPM | TEMPERATURE: 98.06 F | OXYGEN SATURATION: 94 % | SYSTOLIC BLOOD PRESSURE: 119 MMHG

## 2023-11-03 VITALS
SYSTOLIC BLOOD PRESSURE: 164 MMHG | OXYGEN SATURATION: 97 % | HEART RATE: 83 BPM | TEMPERATURE: 98.42 F | RESPIRATION RATE: 18 BRPM | DIASTOLIC BLOOD PRESSURE: 97 MMHG

## 2023-11-03 VITALS
RESPIRATION RATE: 17 BRPM | SYSTOLIC BLOOD PRESSURE: 129 MMHG | HEART RATE: 97 BPM | DIASTOLIC BLOOD PRESSURE: 79 MMHG | OXYGEN SATURATION: 95 % | TEMPERATURE: 98.7 F

## 2023-11-03 VITALS
RESPIRATION RATE: 16 BRPM | DIASTOLIC BLOOD PRESSURE: 79 MMHG | OXYGEN SATURATION: 94 % | SYSTOLIC BLOOD PRESSURE: 120 MMHG | HEART RATE: 102 BPM

## 2023-11-03 VITALS — BODY MASS INDEX: 37 KG/M2 | BODY MASS INDEX: 35.8 KG/M2 | BODY MASS INDEX: 37.8 KG/M2

## 2023-11-03 DIAGNOSIS — E66.9: ICD-10-CM

## 2023-11-03 DIAGNOSIS — M67.01: ICD-10-CM

## 2023-11-03 DIAGNOSIS — G89.18: ICD-10-CM

## 2023-11-03 DIAGNOSIS — E11.9: ICD-10-CM

## 2023-11-03 DIAGNOSIS — M25.772: ICD-10-CM

## 2023-11-03 DIAGNOSIS — M19.072: Primary | ICD-10-CM

## 2023-11-03 DIAGNOSIS — Z79.84: ICD-10-CM

## 2023-11-03 PROCEDURE — 64450 NJX AA&/STRD OTHER PN/BRANCH: CPT

## 2023-11-03 PROCEDURE — 82962 GLUCOSE BLOOD TEST: CPT

## 2023-11-03 PROCEDURE — 27702 RECONSTRUCT ANKLE JOINT: CPT

## 2023-11-03 PROCEDURE — 76000 FLUOROSCOPY <1 HR PHYS/QHP: CPT

## 2023-11-03 PROCEDURE — 73600 X-RAY EXAM OF ANKLE: CPT

## 2023-11-03 PROCEDURE — 27606 INCISION OF ACHILLES TENDON: CPT

## 2023-11-03 RX ADMIN — PREGABALIN 75 MG: 50 CAPSULE ORAL at 07:03

## 2023-11-03 RX ADMIN — BUPIVACAINE HYDROCHLORIDE 0 ML: 2.5 INJECTION, SOLUTION EPIDURAL; INFILTRATION; INTRACAUDAL; PERINEURAL at 09:27

## 2023-11-03 RX ADMIN — Medication 975 MG: at 07:03

## 2023-11-03 RX ADMIN — CEFAZOLIN SODIUM 200 GM: 2 INJECTION, SOLUTION INTRAVENOUS at 08:25

## 2023-11-03 RX ADMIN — SODIUM CHLORIDE, SODIUM LACTATE, POTASSIUM CHLORIDE, AND CALCIUM CHLORIDE 42 ML: .6; .31; .03; .02 INJECTION, SOLUTION INTRAVENOUS at 07:06

## 2023-11-03 RX ADMIN — OXYCODONE HYDROCHLORIDE 5 MG: 5 TABLET ORAL at 13:16

## 2023-11-03 RX ADMIN — ONDANSETRON 4 MG: 2 INJECTION INTRAMUSCULAR; INTRAVENOUS at 13:16

## 2023-11-03 RX ADMIN — CELECOXIB 200 MG: 200 CAPSULE ORAL at 07:03

## 2024-01-23 ENCOUNTER — HOSPITAL ENCOUNTER (OUTPATIENT)
Dept: HOSPITAL 73 - PHYS | Age: 69
LOS: 224 days | Discharge: HOME | End: 2024-09-03
Payer: MEDICARE

## 2024-01-23 VITALS — BODY MASS INDEX: 35.8 KG/M2

## 2024-01-23 DIAGNOSIS — M19.072: Primary | ICD-10-CM

## 2024-01-23 PROCEDURE — 97140 MANUAL THERAPY 1/> REGIONS: CPT

## 2024-01-23 PROCEDURE — 97535 SELF CARE MNGMENT TRAINING: CPT

## 2024-01-23 PROCEDURE — 97162 PT EVAL MOD COMPLEX 30 MIN: CPT

## 2024-01-23 PROCEDURE — 97110 THERAPEUTIC EXERCISES: CPT

## 2024-01-24 ENCOUNTER — HOSPITAL ENCOUNTER (OUTPATIENT)
Dept: HOSPITAL 73 - MRI | Age: 69
End: 2024-01-24
Payer: MEDICARE

## 2024-01-24 VITALS — BODY MASS INDEX: 35.8 KG/M2

## 2024-01-24 DIAGNOSIS — S96.122A: Primary | ICD-10-CM

## 2024-01-24 DIAGNOSIS — S96.812A: ICD-10-CM
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
## 2024-01-24 DIAGNOSIS — M19.072: ICD-10-CM

## 2024-01-24 PROCEDURE — 73718 MRI LOWER EXTREMITY W/O DYE: CPT

## 2024-01-24 PROCEDURE — 73721 MRI JNT OF LWR EXTRE W/O DYE: CPT

## 2024-01-25 ENCOUNTER — HOSPITAL ENCOUNTER (OUTPATIENT)
Age: 69
End: 2024-01-25
Payer: MEDICARE

## 2024-01-25 VITALS — BODY MASS INDEX: 35.8 KG/M2

## 2024-01-25 DIAGNOSIS — M35.3: ICD-10-CM

## 2024-01-25 DIAGNOSIS — E11.42: Primary | ICD-10-CM

## 2024-01-25 LAB
BUN SERPL-MCNC: 30 MG/DL (ref 9–20)
CALCIUM SERPL-MCNC: 9.4 MG/DL (ref 8.4–10.2)
CHLORIDE SERPL-SCNC: 100 MMOL/L (ref 98–107)
CO2 SERPL-SCNC: 29 MMOL/L (ref 22–32)
ESTIMATED GLOMERULAR FILT RATE: > 60 ML/MIN (ref 60–?)
GLUCOSE SERPL-MCNC: 130 MG/DL (ref 80–110)
HBA1C MFR BLD: 6.2 % (ref 4–6)
HEMATOCRIT: 40.1 % (ref 41–53)
HEMOGLOBIN: 13.5 G/DL (ref 13.5–17.5)
HEMOLYSIS: < 15 (ref 0–50)
MCV RBC: 83.1 FL (ref 80–100)
MEAN CORPUSCULAR HEMOGLOBIN: 28 PG (ref 26–34)
MEAN CORPUSCULAR HGB CONC: 33.7 % (ref 30–36)
PLATELET COUNT: 159 X10^3/UL (ref 150–400)
POTASSIUM SERPL-SCNC: 3.7 MMOL/L (ref 3.4–5.1)
SODIUM SERPL-SCNC: 138 MMOL/L (ref 137–145)

## 2024-01-25 PROCEDURE — 83036 HEMOGLOBIN GLYCOSYLATED A1C: CPT

## 2024-01-25 PROCEDURE — 85027 COMPLETE CBC AUTOMATED: CPT

## 2024-01-25 PROCEDURE — 80048 BASIC METABOLIC PNL TOTAL CA: CPT

## 2024-01-25 PROCEDURE — 36415 COLL VENOUS BLD VENIPUNCTURE: CPT

## 2024-02-28 ENCOUNTER — HOSPITAL ENCOUNTER (OUTPATIENT)
Age: 69
Discharge: HOME | End: 2024-02-28
Payer: MEDICARE

## 2024-02-28 VITALS
DIASTOLIC BLOOD PRESSURE: 94 MMHG | TEMPERATURE: 98.24 F | HEART RATE: 80 BPM | SYSTOLIC BLOOD PRESSURE: 157 MMHG | RESPIRATION RATE: 17 BRPM | OXYGEN SATURATION: 99 %

## 2024-02-28 VITALS — BODY MASS INDEX: 35.8 KG/M2 | BODY MASS INDEX: 33.9 KG/M2

## 2024-02-28 DIAGNOSIS — Z53.09: ICD-10-CM

## 2024-02-28 DIAGNOSIS — S96.122A: Primary | ICD-10-CM

## 2024-02-28 DIAGNOSIS — G57.32: ICD-10-CM

## 2024-02-28 DIAGNOSIS — M67.89: ICD-10-CM

## 2024-02-28 PROCEDURE — 27691 REVISE LOWER LEG TENDON: CPT

## 2024-10-16 ENCOUNTER — HOSPITAL ENCOUNTER (OUTPATIENT)
Dept: HOSPITAL 73 - LAB | Age: 69
End: 2024-10-16
Payer: MEDICARE

## 2024-10-16 VITALS — BODY MASS INDEX: 35.8 KG/M2

## 2024-10-16 DIAGNOSIS — E11.42: Primary | ICD-10-CM

## 2024-10-16 DIAGNOSIS — N40.1: ICD-10-CM

## 2024-10-16 DIAGNOSIS — E78.2: ICD-10-CM

## 2024-10-16 DIAGNOSIS — N13.8: ICD-10-CM

## 2024-10-16 LAB
BUN SERPL-MCNC: 25 MG/DL (ref 9–20)
CALCIUM SERPL-MCNC: 9.1 MG/DL (ref 8.4–10.2)
CHLORIDE SERPL-SCNC: 106 MMOL/L (ref 98–107)
CHOLEST SERPL-MCNC: 132 MG/DL (ref 140–199)
CO2 SERPL-SCNC: 24 MMOL/L (ref 22–32)
ESTIMATED GLOMERULAR FILT RATE: > 60 ML/MIN (ref 60–?)
GLUCOSE SERPL-MCNC: 131 MG/DL (ref 80–110)
HBA1C MFR BLD: 7 % (ref 4–6)
HDLC SERPL-MCNC: 42 MG/DL (ref 40–60)
HEMOLYSIS: < 15 (ref 0–50)
MICROALBUMI CREATININ RATIO UR: 395 UG/MG CR (ref ?–30)
POTASSIUM SERPL-SCNC: 4 MMOL/L (ref 3.4–5.1)
PSA SERPL-MCNC: 2.16 NG/ML (ref 0.1–4)
SODIUM SERPL-SCNC: 142 MMOL/L (ref 137–145)
TRIGL SERPL-MCNC: 122 MG/DL (ref 35–150)

## 2024-10-16 PROCEDURE — 80048 BASIC METABOLIC PNL TOTAL CA: CPT

## 2024-10-16 PROCEDURE — 84450 TRANSFERASE (AST) (SGOT): CPT

## 2024-10-16 PROCEDURE — 36415 COLL VENOUS BLD VENIPUNCTURE: CPT

## 2024-10-16 PROCEDURE — 80061 LIPID PANEL: CPT

## 2024-10-16 PROCEDURE — 84153 ASSAY OF PSA TOTAL: CPT

## 2024-10-16 PROCEDURE — 82570 ASSAY OF URINE CREATININE: CPT

## 2024-10-16 PROCEDURE — 83036 HEMOGLOBIN GLYCOSYLATED A1C: CPT

## 2024-10-16 PROCEDURE — 82043 UR ALBUMIN QUANTITATIVE: CPT

## 2025-02-05 ENCOUNTER — HOSPITAL ENCOUNTER (OUTPATIENT)
Dept: HOSPITAL 73 - LAB | Age: 70
End: 2025-02-05
Payer: MEDICARE

## 2025-02-05 VITALS — BODY MASS INDEX: 35.8 KG/M2

## 2025-02-05 DIAGNOSIS — D50.9: Primary | ICD-10-CM

## 2025-02-05 DIAGNOSIS — E11.69: ICD-10-CM

## 2025-02-05 DIAGNOSIS — E78.5: ICD-10-CM

## 2025-02-05 LAB
BUN SERPL-MCNC: 22 MG/DL (ref 9–20)
CALCIUM SERPL-MCNC: 8.9 MG/DL (ref 8.4–10.2)
CHLORIDE SERPL-SCNC: 103 MMOL/L (ref 98–107)
CO2 SERPL-SCNC: 27 MMOL/L (ref 22–32)
ESTIMATED GLOMERULAR FILT RATE: 55 ML/MIN (ref 60–?)
FERRITIN SERPL-MCNC: 7 NG/ML (ref 18–464)
GLUCOSE SERPL-MCNC: 121 MG/DL (ref 80–110)
HBA1C MFR BLD: 6.4 % (ref 4–6)
HEMATOCRIT: 39.8 % (ref 41–53)
HEMOGLOBIN: 13 G/DL (ref 13.5–17.5)
HEMOLYSIS: < 15 (ref 0–50)
HEMOLYSIS: < 15 (ref 0–50)
IRON SERPL-MCNC: 54 UG/DL (ref 49–181)
MCV RBC: 80.4 FL (ref 80–100)
MEAN CORPUSCULAR HEMOGLOBIN: 26.4 PG (ref 26–34)
MEAN CORPUSCULAR HGB CONC: 32.8 % (ref 30–36)
PERCENT IRON SATURATION: 13 % (ref 20–50)
PLATELET COUNT: 129 X10^3/UL (ref 150–400)
POTASSIUM SERPL-SCNC: 3.9 MMOL/L (ref 3.4–5.1)
SODIUM SERPL-SCNC: 140 MMOL/L (ref 137–145)
TIBC SERPL-MCNC: 405 UG/DL (ref 261–462)
TRANSFERRIN SERPL-MCNC: 384 MG/DL (ref 206–381)

## 2025-02-05 PROCEDURE — 83550 IRON BINDING TEST: CPT

## 2025-02-05 PROCEDURE — 36415 COLL VENOUS BLD VENIPUNCTURE: CPT

## 2025-02-05 PROCEDURE — 83036 HEMOGLOBIN GLYCOSYLATED A1C: CPT

## 2025-02-05 PROCEDURE — 85027 COMPLETE CBC AUTOMATED: CPT

## 2025-02-05 PROCEDURE — 83540 ASSAY OF IRON: CPT

## 2025-02-05 PROCEDURE — 82728 ASSAY OF FERRITIN: CPT

## 2025-02-05 PROCEDURE — 80048 BASIC METABOLIC PNL TOTAL CA: CPT

## 2025-04-20 ENCOUNTER — HOSPITAL ENCOUNTER (OUTPATIENT)
Dept: HOSPITAL 73 - CT | Age: 70
End: 2025-04-20
Payer: MEDICARE

## 2025-04-20 VITALS — BODY MASS INDEX: 35.8 KG/M2

## 2025-04-20 DIAGNOSIS — J34.3: ICD-10-CM

## 2025-04-20 DIAGNOSIS — J32.4: Primary | ICD-10-CM

## 2025-04-20 DIAGNOSIS — J34.89: ICD-10-CM

## 2025-04-20 DIAGNOSIS — J34.2: ICD-10-CM

## 2025-04-20 PROCEDURE — 70486 CT MAXILLOFACIAL W/O DYE: CPT

## 2025-06-04 ENCOUNTER — HOSPITAL ENCOUNTER (OUTPATIENT)
Age: 70
End: 2025-06-04
Payer: MEDICARE

## 2025-06-04 VITALS — BODY MASS INDEX: 35.8 KG/M2

## 2025-06-04 DIAGNOSIS — E11.42: Primary | ICD-10-CM

## 2025-06-04 LAB
BUN SERPL-MCNC: 36 MG/DL (ref 9–20)
BUN/CREAT SERPL: 25.5 (ref 6–22)
CALCIUM SERPL-MCNC: 8.6 MG/DL (ref 8.4–10.2)
CHLORIDE SERPL-SCNC: 98 MMOL/L (ref 98–107)
CO2 SERPL-SCNC: 31 MMOL/L (ref 22–32)
CREAT SERPL-MCNC: 1.41 MG/DL (ref 0.66–1.25)
ESTIMATED GLOMERULAR FILT RATE: 53 ML/MIN (ref 60–?)
GLUCOSE SERPL-MCNC: 169 MG/DL (ref 70–99)
HBA1C MFR BLD: 6.3 % (ref 4–6)
HEMOLYSIS: < 15 (ref 0–50)
POTASSIUM SERPL-SCNC: 3.7 MMOL/L (ref 3.4–5.1)
SODIUM SERPL-SCNC: 138 MMOL/L (ref 137–145)

## 2025-06-04 PROCEDURE — 80048 BASIC METABOLIC PNL TOTAL CA: CPT

## 2025-06-04 PROCEDURE — 83036 HEMOGLOBIN GLYCOSYLATED A1C: CPT

## 2025-06-04 PROCEDURE — 36415 COLL VENOUS BLD VENIPUNCTURE: CPT

## 2025-06-11 ENCOUNTER — HOSPITAL ENCOUNTER (OUTPATIENT)
Age: 70
End: 2025-06-11
Payer: MEDICARE

## 2025-06-11 VITALS — BODY MASS INDEX: 35.8 KG/M2

## 2025-06-11 DIAGNOSIS — E11.69: Primary | ICD-10-CM

## 2025-06-11 DIAGNOSIS — H49.13: ICD-10-CM

## 2025-06-11 DIAGNOSIS — E78.5: ICD-10-CM

## 2025-06-11 LAB
ALBUMIN SERPL-MCNC: 4.2 G/DL (ref 3.5–5)
ALBUMIN/GLOB SERPL: 2 {RATIO} (ref 1–2.8)
ALP SERPL-CCNC: 77 U/L (ref 38–126)
ALT SERPL-CCNC: 27 IU/L (ref ?–50)
AST SERPL-CCNC: 22 IU/L (ref 17–59)
BILIRUB SERPL-MCNC: 0.4 MG/DL (ref 0.2–1.3)
BUN SERPL-MCNC: 29 MG/DL (ref 9–20)
BUN/CREAT SERPL: 20.7 (ref 6–22)
CALCIUM SERPL-MCNC: 8.9 MG/DL (ref 8.4–10.2)
CHLORIDE SERPL-SCNC: 103 MMOL/L (ref 98–107)
CO2 SERPL-SCNC: 27 MMOL/L (ref 22–32)
CREAT SERPL-MCNC: 1.4 MG/DL (ref 0.66–1.25)
ERYTHROCYTE [SEDIMENTATION RATE] IN BLOOD: 7 MM/HR (ref 0–15)
ESTIMATED GLOMERULAR FILT RATE: 54 ML/MIN (ref 60–?)
GLOBULIN SER CALC-MCNC: 2.1 G/DL (ref 1.7–4.1)
GLUCOSE SERPL-MCNC: 156 MG/DL (ref 70–99)
HEMATOCRIT: 39.3 % (ref 41–53)
HEMOGLOBIN: 12.7 G/DL (ref 13.5–17.5)
HEMOLYSIS: < 15 (ref 0–50)
MCH RBC QN AUTO: 25.7 PG (ref 26–34)
MCV RBC: 79.5 FL (ref 80–100)
MEAN CORPUSCULAR HGB CONC: 32.4 % (ref 30–36)
PLATELET COUNT: 127 X10^3/UL (ref 150–400)
POTASSIUM SERPL-SCNC: 3.8 MMOL/L (ref 3.4–5.1)
PROT SERPL-MCNC: 6.3 G/DL (ref 6.3–8.2)
RED BLOOD CELL COUNT: 4.95 X10^6/UL (ref 4.5–5.9)
RED CELL DISTRIBUTION WIDTH: 16.1 % (ref 11.6–14.8)
SODIUM SERPL-SCNC: 141 MMOL/L (ref 137–145)
WHITE BLOOD CELL COUNT: 7.1 X10^3/UL (ref 4.5–11)

## 2025-06-11 PROCEDURE — 86140 C-REACTIVE PROTEIN: CPT

## 2025-06-11 PROCEDURE — 80053 COMPREHEN METABOLIC PANEL: CPT

## 2025-06-11 PROCEDURE — 85651 RBC SED RATE NONAUTOMATED: CPT

## 2025-06-11 PROCEDURE — 85027 COMPLETE CBC AUTOMATED: CPT

## 2025-06-11 PROCEDURE — 36415 COLL VENOUS BLD VENIPUNCTURE: CPT

## 2025-06-12 LAB — CRP, HIGH SENSITIVITY: 0.74 MG/L (ref 0–3)

## 2025-06-18 ENCOUNTER — HOSPITAL ENCOUNTER (OUTPATIENT)
Dept: HOSPITAL 73 - MRI | Age: 70
End: 2025-06-18
Payer: MEDICARE

## 2025-06-18 VITALS — BODY MASS INDEX: 35.8 KG/M2

## 2025-06-18 DIAGNOSIS — E11.69: ICD-10-CM

## 2025-06-18 DIAGNOSIS — H49.13: Primary | ICD-10-CM

## 2025-06-18 DIAGNOSIS — H81.4: ICD-10-CM

## 2025-06-18 DIAGNOSIS — E78.5: ICD-10-CM

## 2025-06-18 PROCEDURE — 70549 MR ANGIOGRAPH NECK W/O&W/DYE: CPT

## 2025-06-18 PROCEDURE — 70544 MR ANGIOGRAPHY HEAD W/O DYE: CPT

## 2025-06-18 PROCEDURE — 70553 MRI BRAIN STEM W/O & W/DYE: CPT

## 2025-06-18 PROCEDURE — A9579 GAD-BASE MR CONTRAST NOS,1ML: HCPCS
